# Patient Record
Sex: MALE | Race: WHITE
[De-identification: names, ages, dates, MRNs, and addresses within clinical notes are randomized per-mention and may not be internally consistent; named-entity substitution may affect disease eponyms.]

---

## 2017-01-18 ENCOUNTER — HOSPITAL ENCOUNTER (INPATIENT)
Dept: HOSPITAL 47 - 3MHU | Age: 27
LOS: 1 days | Discharge: HOME | DRG: 885 | End: 2017-01-19
Payer: COMMERCIAL

## 2017-01-18 VITALS — BODY MASS INDEX: 26.2 KG/M2

## 2017-01-18 DIAGNOSIS — Z91.14: ICD-10-CM

## 2017-01-18 DIAGNOSIS — R63.4: ICD-10-CM

## 2017-01-18 DIAGNOSIS — Z91.048: ICD-10-CM

## 2017-01-18 DIAGNOSIS — D68.2: ICD-10-CM

## 2017-01-18 DIAGNOSIS — F33.9: Primary | ICD-10-CM

## 2017-01-18 DIAGNOSIS — F43.20: ICD-10-CM

## 2017-01-18 DIAGNOSIS — R45.851: ICD-10-CM

## 2017-01-18 DIAGNOSIS — Z82.49: ICD-10-CM

## 2017-01-18 DIAGNOSIS — T39.016A: ICD-10-CM

## 2017-01-18 DIAGNOSIS — Z81.1: ICD-10-CM

## 2017-01-18 DIAGNOSIS — K58.9: ICD-10-CM

## 2017-01-18 DIAGNOSIS — Z63.0: ICD-10-CM

## 2017-01-18 DIAGNOSIS — Z63.5: ICD-10-CM

## 2017-01-18 DIAGNOSIS — F17.200: ICD-10-CM

## 2017-01-18 DIAGNOSIS — Z80.3: ICD-10-CM

## 2017-01-18 DIAGNOSIS — Z83.3: ICD-10-CM

## 2017-01-18 DIAGNOSIS — F41.9: ICD-10-CM

## 2017-01-18 PROCEDURE — 84443 ASSAY THYROID STIM HORMONE: CPT

## 2017-01-18 RX ADMIN — NICOTINE SCH PATCH: 21 PATCH, EXTENDED RELEASE TRANSDERMAL at 09:41

## 2017-01-18 SDOH — SOCIAL STABILITY - SOCIAL INSECURITY: PROBLEMS IN RELATIONSHIP WITH SPOUSE OR PARTNER: Z63.0

## 2017-01-18 SDOH — SOCIAL STABILITY - SOCIAL INSECURITY: DISRUPTION OF FAMILY BY SEPARATION AND DIVORCE: Z63.5

## 2017-01-18 NOTE — P.HP
Psychiatric H&P





- .


History & Physical: 


 Allergies











Allergy/AdvReac Type Severity Reaction Status Date / Time


 


hay Allergy  Unknown Uncoded 01/18/17 08:07








 Vital Signs











Temp  97.2 F L  01/18/17 08:05


 


Pulse  81   01/18/17 08:05


 


Resp  20   01/18/17 08:05


 


BP  112/87   01/18/17 08:05


 


Pulse Ox      








 Intake & Output











 01/17/17 01/18/17 01/18/17





 18:59 06:59 18:59


 


Weight   90.265 kg











01/18/17 09:20


IDENTIFYING DATA: This patient is a 26-year-old   male who 

presents to the mental health unit from [facility] as a transfer 

due to recent suicidal ideation.


HPI: The patient presents with a petition filled out by a  

stating "on 01/17/2017 Ofc. received radio run to [address] [place] [address] 

referent suicidal subject Anatoliy Au.  Anatoliy Au called his wife and 

told her that he was suicidal.  Anatoliy was coming home in his vehicle upon 

arrival Anatoliy was parked in his driveway.  Anatoliy told me that he was going 

through a divorce and wanted to commit suicide."  The patient states that last 

evening while working as an [employer]  he was thinking about all the changes 

that were going to take place subsequent to his wife leaving.  He states that 

his wife had an affair back in October.  He states he has not been able to 

reconcile the relationship and does not trust her.  She apparently plans on 

moving back to the [facility] and taking their son they have together.  He 

is conflicted as to whether or not to let her go with their child they also 

raise his other son who is a similar age.  He felt overwhelmed that his 

children would be  and that he would not see his son for 1 month at a 

time.  He states he did have suicidal thoughts but he no longer does as he is 

worked out what he wants to do.  He does plan on filing for divorce and either 

fighting to keep the children both in this area or [place]  He states that he has struggle with depressive 

feelings ever since childhood he would say that he has had a depressive episode 

in the past but doesn't feel that he is depressed now.  He feels more angry and 

irritable due to this situation and his wife having the affair.  The patient 

has been working with an individual therapist and has seen him 11-12 times and 

feels it has been beneficial.  He states had he been able to make it to his 

appointment last evening he would not of been here because he could've process 

the stressors further in session.  He states that he does typically have some 

baseline anxiety but he does not feel that it's excessive or disproportional to 

stressors involved.  He is endorsing no panic attacks.  He is endorsing no 

crying spells, sleep is probably 5-6 hours a night and that has been a chronic 

issue for him.  Appetite is stable he reports having some weight loss over the 

summer due to increased work demands.  Energy level stable.  He states he does 

not feel hopeless today.  He is reporting no homicidal ideation.  He endorses 

no auditory or visual hallucinations he reports no specific delusions as we 

reviewed several types.  He has no firearms at home.


PAST PSYCHIATRIC HISTORY: no prior inpatient psychiatric admissions, no history 

of suicide attempts or other self-injurious behavior.  He is working with an 

outpatient therapist as noted.  He has never been prescribed psychotropic 

medication.  He does not wish to take psychotropic medication.  []


PMH: he reports having a factor V blood disorder, IBS []


ALLERGIES: NO KNOWN DRUG ALLERGIES  []


MEDICATIONS:  none []


CHEMICAL DEPENDENCY HISTORY: he reports using alcohol once a month having 2-3 

beers at a time, he reports no excessive use of alcohol, no use of marijuana or 

other illicit drugs.  He has never been placed in residential treatment for 

chemical dependency reasons.   []


FAMILY PSYCHIATRIC HISTORY:  none reported no suicides in the family  []


FAMILY CHEMICAL DEPENDENCY HISTORY: history father is known to have an alcohol 

use disorder  []


SOCIAL HISTORY: The patient is 26 years old he is  for approximately 4 

years and is planning on getting .  He has 2 children both sons age 5 

and 6.  His oldest is with a woman from a prior relationship but his current 

wife has been involved in his life for several years.  The patient is employed 

as a  worker mainly doing demolition/construction work.  He 

graduated high school and attended 2 years of college.  No  experience.

  He is originally from [place] his family moved to the [facility] for 

several years and he has been in [facility] now again for several years.  He 

was mainly raised by his mother as his father was alcohol dependent and left 

when the patient was 4.  The patient has 2 half-brothers and 2 half-sisters.  

He feels his mother father and half siblings are supportive.  No legal history 

no history of violence, no abuse history. []


MENTAL STATUS EXAM: the patient is an alert  male appearing older than 

his stated age, he wears eyeglasses he is balding he has a beard.  He is seated 

calmly in the chair eye contact is appropriate he's pleasant and cooperative.  

He is easily directed in the session.  He endorses a frustrated mood with more 

irritability and anger due to the marital issues.  Affect is mildly constricted 

but he demonstrates an appropriate range of affect overall.  He demonstrates no 

tearfulness.  He denies having any acute suicidal or homicidal ideation intent 

or plan.  He endorses no auditory or visual hallucinations he endorses no 

specific delusions and we reviewed several types.  There is no overt evidence 

of psychosis.  He does not appear hypomanic or manic.  Thought process is 

linear he demonstrates no tangential thinking flight of ideas or loose 

associations.  Speech is fluent and spontaneous nonpressured.  Insight and 

judgment appears grossly intact.  There is no verbal or physical aggressiveness 

demonstrated there is no observed psychomotor slowing or agitation.  

Cognitively he is alert and oriented to person place and date he is able to 

spell world backwards.  He is able to remember 3 words after delay of several 

minutes in terms of short-term memory.   []


STRENGTHS/WEAKNESSES:  strengths: Housing, income, family support weaknesses: 

Failing marriage, the patient needs to negotiate parenting time []


INTELLECTUAL FUNCTIONING: average []


IMPRESSIONS: []


1.  History of major depressive disorder recurrent, likely adjustment disorder 

with mixed anxiety and mood symptoms


2.  Factor V blood disorder, IBS


3.  Marital strain possible impending divorce and need for negotiating 

parenting time





PLAN: The patient has been admitted to the mental health unit he was willing to 

sign in voluntarily.  We reviewed his symptoms and possible medication options.

  He does not wish to pursue psychotropic medication at this time but is 

willing to continue working with his individual therapist.  We discussed that 

an antidepressant medication may alleviate some mood and anxiety symptoms.  He 

is willing to participate in a family meeting social work will be asked to 

complete a psychosocial assessment and arrange a family meeting.  We will 

request a routine medical consultation.  We will monitor the patient for safety 

and encourage his participation in the milieu.  We will reassess for need of 

medication.  I anticipate he will be appropriate for discharge sometime this 

week.  []

## 2017-01-18 NOTE — HP
DATE OF ADMISSION:  



CHIEF COMPLAINT:  Acute depression.   



HISTORY OF PRESENT ILLNESS:  This is a 26-year-old  male who 

presented to the Emergency Department with depression.  Patient was 

admitted to the Psych floor.  Currently denied any chest pain, 

shortness of breath, nausea, vomiting or abdominal pain.  There is no 

history of (    ), blurry vision or ringing in the ears. 



REVIEW OF SYSTEMS:  As 14 systems reviewed and negative except as 

above.  



ALLERGY:  No known drug allergy.  Patient is allergic to HAY. 



PAST MEDICAL AND SURGICAL HISTORY:  

1. 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 

but did now follow the physician and told him that he can either take 

it or do not take it and he has been avoiding taking any unnecessary 

pills and does not like to be started on aspirin here.   

2. Irritable bowel syndrome.  He takes as needed over-the-counter 

medication. 



FAMILY HISTORY:  Father with coronary artery disease and diabetes 

types 2.  Mother with history of breast cancer. 



SOCIAL HISTORY:  Patient smoker for 1 pack per day.  Denied history of 

alcohol or drug abuse.  



MEDICATIONS:  None. 



PHYSICAL EXAMINATION:

Vitals signs reviewed are stable.

GENERAL:  (    ) in no acute distress. 

HEENT:  Atraumatic, normocephalic. 

NECK:  No neck mass, no thyromegaly. 

LUNGS:  Clear to auscultation bilaterally. 

HEART:  S1, S2. 

ABDOMEN:  Soft, nontender, bowel sounds in all 4 quadrants.

Lower extremity, no edema.

SKIN:  No rash. 

PSYCH:  Alert and oriented x3.  (    ).

NEURO:  Cranial nerves 2 through 12 are intact.  Normal (    ) 

reflexes and sensation.  



Imaging and labs reviewed. 



ASSESSMENT AND PLAN:

1. Depression per your management.

2. Factor V deficiency, patient elected not to be treated with aspirin 

and would like to follow up with his primary care physician 

outpatient.  Will hold any medication at this point. Patient denied 

any history of deep venous thrombosis or pulmonary embolism in the 

past. 

3. Irritable bowel syndrome.  Patient said that he is having regular 

bowel movement and he takes medication on as-needed basis.  Currently 

feels stable without any complaint. 



Discharge planning per Primary Team.

## 2017-01-19 VITALS
DIASTOLIC BLOOD PRESSURE: 57 MMHG | HEART RATE: 56 BPM | TEMPERATURE: 97.4 F | RESPIRATION RATE: 18 BRPM | SYSTOLIC BLOOD PRESSURE: 116 MMHG

## 2017-01-19 RX ADMIN — NICOTINE SCH PATCH: 21 PATCH, EXTENDED RELEASE TRANSDERMAL at 09:40

## 2017-01-19 NOTE — P.DS
Providers


Date of admission: 


01/18/17 07:30





Expected date of discharge: 01/19/17


Attending physician: 


Alex Pacheco





Consults: 





 





01/18/17 08:09


Consult Physician Routine 


   Consulting Provider: Jimmy Garland Reason/Comments: history and physical


   Do you want consulting provider notified?: Yes











Primary care physician: 


Stated None








- Discharge Diagnosis(es)


(1) Major depressive disorder


Current Visit: Yes   Status: Acute   Priority: High   


Hospital Course: 





Brief summary of admission note: The patient is a 26-year-old   

male who presented to the mental health unit from [facility] due 

to recent suicidal ideation.  The patient was brought in by the police as his 

wife had called them for assistance.  The patient had verbalized to his wife 

that he was having suicidal thoughts.  He reports back in October his wife had 

an affair.  He has not been able to reconcile that in the marriage had been 

deteriorating since.  His wife had recently informed him that she was going to 

move to the [facility] intake their mutual child a 5-year-old son.  The 

patient has a 6-year-old son that they have also raised together.  He felt 

overwhelmed that his children would be  and that he would not feel the 

see has 1 son for extended periods at a time.  For full details please refer to 

my psychiatric evaluation dated 01/18/2017.





Summary of hospital course: The patient was admitted to the mental health unit 

he signed in voluntarily.  We reviewed his presenting symptoms and medication 

options.  He states he does not wish to pursue use of medication but was 

willing to continue with individual outpatient therapy.  Upon presentation to 

the hospital he stated he had no suicidal thoughts and states he never really 

would act on it.  He felt temporarily overwhelmed by stressors and not knowing 

what to do but he states he has decided what his course of action should be.  

He verbalizes that he will file for divorce and fight to keep the children in 

this area so that he has parenting time equally.  He alternatively thought 

about moving up to the [facility] himself but feels that that is not 

acceptable.  He reports having support from his father and half brothers.  His 

wife will be participating in a support meeting late this morning.  The patient 

has been directable he is demonstrated no agitated behavior.  He is able to 

participate in his own activities of daily living.  Appetite is been adequate 

here on the mental health unit, he has been maintaining his hygiene.  He has 

demonstrated no psychosis, symptoms of donna or any agitated behavior.  We 

requested a routine medical consultation.





Mental status exam: The patient is an alert  male appearing older than 

his stated age.  He is balding he has a beard and wears eyeglasses.  He is 

dressed in his own clothing eye contact is appropriate.  Speech is fluent 

spontaneous nonpressured.  He states his mood is "better" affect is euthymic 

with an appropriate range of expression.  He demonstrates no tearfulness.  

Thought process is linear and goal-directed, there is no evidence of 

circumstantial thinking, tangential thinking, loose associations, or flight of 

ideas.  He reports having no suicidal or homicidal ideation intent or plan.  He 

endorses no racing thoughts and there is no evidence of any hypomanic or manic 

symptoms.  He endorses no auditory or visual hallucinations or specific 

delusions.  There is no overt evidence of psychosis.  Insight and judgment 

appears to be grossly intact.  Cognitively he remains grossly intact and is 

alert and oriented to person place and date.  He demonstrates no verbal or 

physical aggressiveness.





Impressions


1.  History of major depressive disorder recurrent, adjustment disorder with 

mixed anxiety and mood symptoms


2.  Factor V blood disorder, IBS


3.  Marital strain, impending divorce





Plan: The patient will be discharged from the mental health unit today 

following his support meeting.  He does not wish to utilize any medication at 

this time.  There is no need for us to pursue involuntary medication treatment.

  He is able to navigate his own activities of daily living, there are no 

symptoms of psychosis, he is not currently hypomanic or manic and he is 

reporting no suicidal ideation.  He is instructed to continue abstaining from 

any alcohol use or any other substances.  He reports no access to firearms.  He 

will continue working with his individual therapist social work will confirm 

his outpatient appointment for mental health services.  He is instructed to 

return to the hospital if any acute safety concerns


Patient Condition at Discharge: Stable





Plan - Discharge Summary


Discharge Medication List





No Known Home Medications [No Known Home Medications]  01/18/17 [History]

## 2022-07-20 ENCOUNTER — HOSPITAL ENCOUNTER (OUTPATIENT)
Dept: HOSPITAL 47 - RADCTMAIN | Age: 32
Discharge: HOME | End: 2022-07-20
Attending: INTERNAL MEDICINE
Payer: COMMERCIAL

## 2022-07-20 DIAGNOSIS — J44.9: Primary | ICD-10-CM

## 2022-07-20 DIAGNOSIS — R16.1: ICD-10-CM

## 2022-07-20 PROCEDURE — 71260 CT THORAX DX C+: CPT

## 2022-07-20 NOTE — CT
EXAMINATION TYPE: CT chest w con

 

DATE OF EXAM: 7/20/2022

 

COMPARISON: Chest x-ray 6/17/2022

 

HISTORY: dyspnea, COPD

 

CT DLP: 527.2 mGycm

Automated exposure control for dose reduction was used.

 

CONTRAST: 

CT scan of the chest is performed with IV Contrast, patient injected with 70 mL of Isovue 300.

 

FINDINGS:

 

LUNGS: The lungs are remarkable for some probable subsegmental basilar atelectatic change or scarring
 posteriorly, there is no concerning parenchymal mass or nodule identified. Extensive emphysematous c
hanges are present especially in the upper lobes. No endobronchial lesion. There is no pleural effusi
on or pneumothorax seen.  The tracheobronchial tree is patent.

 

MEDIASTINUM: There are no greater than 1 cm hilar or mediastinal lymph nodes.   No pericardial effusi
on is seen.  

 

AORTA:  No additional significant abnormality is seen.

 

 

OTHER:  Nodular contour of the liver may be indicative of underlying cirrhosis. Gallbladder somewhat 
hydropic. Spleen is enlarged. There is thoracic spondylosis..

 

IMPRESSION:  Emphysema. Correlate for possible cirrhosis, splenomegaly and additional nonspecific fin
dings above